# Patient Record
Sex: FEMALE | Race: BLACK OR AFRICAN AMERICAN | RURAL
[De-identification: names, ages, dates, MRNs, and addresses within clinical notes are randomized per-mention and may not be internally consistent; named-entity substitution may affect disease eponyms.]

---

## 2017-01-01 ENCOUNTER — OFFICE VISIT (OUTPATIENT)
Dept: FAMILY MEDICINE CLINIC | Age: 82
End: 2017-01-01

## 2017-01-01 ENCOUNTER — OFFICE VISIT (OUTPATIENT)
Dept: CARDIOLOGY CLINIC | Age: 82
End: 2017-01-01

## 2017-01-01 VITALS
WEIGHT: 178 LBS | HEART RATE: 71 BPM | DIASTOLIC BLOOD PRESSURE: 68 MMHG | BODY MASS INDEX: 34.95 KG/M2 | OXYGEN SATURATION: 96 % | SYSTOLIC BLOOD PRESSURE: 110 MMHG | HEIGHT: 60 IN | RESPIRATION RATE: 20 BRPM

## 2017-01-01 VITALS
WEIGHT: 175.6 LBS | SYSTOLIC BLOOD PRESSURE: 112 MMHG | RESPIRATION RATE: 16 BRPM | BODY MASS INDEX: 34.29 KG/M2 | HEART RATE: 69 BPM | DIASTOLIC BLOOD PRESSURE: 72 MMHG | OXYGEN SATURATION: 98 %

## 2017-01-01 DIAGNOSIS — I34.0 MITRAL VALVE INSUFFICIENCY, UNSPECIFIED ETIOLOGY: ICD-10-CM

## 2017-01-01 DIAGNOSIS — C90.00 MULTIPLE MYELOMA WITHOUT REMISSION (HCC): ICD-10-CM

## 2017-01-01 DIAGNOSIS — I50.22 SYSTOLIC CHF, CHRONIC (HCC): ICD-10-CM

## 2017-01-01 DIAGNOSIS — I50.9 CHRONIC CONGESTIVE HEART FAILURE, UNSPECIFIED CONGESTIVE HEART FAILURE TYPE: ICD-10-CM

## 2017-01-01 DIAGNOSIS — I48.20 CHRONIC ATRIAL FIBRILLATION (HCC): ICD-10-CM

## 2017-01-01 DIAGNOSIS — I48.20 CHRONIC ATRIAL FIBRILLATION (HCC): Primary | ICD-10-CM

## 2017-01-01 DIAGNOSIS — I10 ESSENTIAL HYPERTENSION WITH GOAL BLOOD PRESSURE LESS THAN 140/90: ICD-10-CM

## 2017-01-01 DIAGNOSIS — I42.9 CARDIOMYOPATHY (HCC): Primary | ICD-10-CM

## 2017-01-01 DIAGNOSIS — I48.3 TYPICAL ATRIAL FLUTTER (HCC): ICD-10-CM

## 2017-01-01 DIAGNOSIS — I11.9 BENIGN HYPERTENSIVE HEART DISEASE WITHOUT HEART FAILURE: ICD-10-CM

## 2017-01-01 DIAGNOSIS — I27.20 PULMONARY HYPERTENSION (HCC): ICD-10-CM

## 2017-01-01 RX ORDER — FUROSEMIDE 80 MG/1
TABLET ORAL
Qty: 135 TAB | Refills: 6 | Status: SHIPPED | OUTPATIENT
Start: 2017-01-01

## 2017-01-01 RX ORDER — IPRATROPIUM BROMIDE AND ALBUTEROL SULFATE 2.5; .5 MG/3ML; MG/3ML
SOLUTION RESPIRATORY (INHALATION)
Qty: 1620 ML | Refills: 0 | Status: SHIPPED | OUTPATIENT
Start: 2017-01-01

## 2017-01-24 NOTE — PROGRESS NOTES
1/24/2017    Chief Complaint   Patient presents with    Hypertension     Checkup        HPI: Heena Anthony is a 80 y.o. female. Very pleasant remarkable BF. The patient has had a 10 pound weight loss over the last four months. Blood studies performed in the office showed a total globulin level of 6.4. A serum immunoelectrophoresis showed an M-spike of 4.6. BUN was 21, creatinine 1.1, calcium level 8.5. Total globulin level 7.1. Sugar 115, white count 3.0, hemoglobin 10.3, hematocrit 31.2, platelet count 792,925. IgG was 6,248, IgA was 78, IgM was less than 6.     The SIEP shows a IgG monoclonal gammopathy with kappa light chain specificity. The IgG levewl is over 6 G/dl. The Pgcq-4-ypoakeuchmrpr and SFLC are elevated and the serum creatinine is 1.4. Ca is 8.4. The skeletal survey shows lytic bone changes and WBC is 2.8, HGB 10.1, Plat 117K with 47% segs.     This patient most likely has advanced multiple myeloma and needs a bone marrow biopsy to establish a definitive diagnosis with the need for emergent therapy. . I explained to the patient and her daughter that her life expectancy without treatment is probably less than three months. She is adamant that she does not want to pursue treatment. Presents today for follow up HPTN, atrial fib, CHF. She denies any pain. She is tired, not as much energy. This would be expected with her diagnosis.    Discussed that the goals of care are comfort, respect and dignity.      No Known Allergies    Current Outpatient Prescriptions   Medication Sig Dispense Refill    losartan (COZAAR) 100 mg tablet TAKE 1 TABLET BY MOUTH ONCE DAILY 90 Tab 0    carvedilol (COREG) 25 mg tablet TAKE 1 TABLET BY MOUTH TWICE DAILY WITH MEALS 180 Tab 0    furosemide (LASIX) 80 mg tablet TAKE 1 TABLET BY MOUTH EVERY MORNING AND THEN TAKE 1/2 TABLET BY MOUTH EVERY EVENING 135 Tab 6    potassium chloride (K-DUR, KLOR-CON) 20 mEq tablet TAKE 1 TABLET BY MOUTH TWICE DAILY FOR LOW POTASSIUM PREVENTION 180 Tab 1    aspirin (ASPIRIN) 325 mg tablet Take 325 mg by mouth daily.  OTHER USES RESCUE INHALER AND NEBULIZER AS NEEDED FOR BRONCHITIS/WHEEZING         Past Medical History   Diagnosis Date    Atrial fibrillation ([facility] 8/23/2012     It was decided not to anticoagulateher at her age because of ICB risks.  Atrial flutter (HCC)     Benign hypertensive heart disease without heart failure     Mitral insufficiency     Other primary cardiomyopathies ([facility]     [facility]        Lab Results   Component Value Date/Time    Glucose 119 11/15/2016 08:38 AM    LDL, calculated 51 05/06/2014 10:24 AM    Creatinine 1.14 11/15/2016 08:38 AM       ROS:  Constitutional: No fever, chills or weight loss  Respiratory: No cough, SOB   CV: No chest pain or Palpitations  GI: No nausea, vomiting or diarrhea. : No dysuria or hematuria. Neuro: No headaches, seizures, change in mental status. Physical Exam:   VS Visit Vitals    /72 (BP 1 Location: Right arm, BP Patient Position: Sitting)    Pulse 69    Resp 16    Wt 175 lb 9.6 oz (79.7 kg)    SpO2 98%    BMI 34.29 kg/m2      Eyes Conjunctiva and lids normal.    PERRLA, EOMI.   ENMT External ears and nose normal.  Canals normal, TMs normal.    Lips, teeth, gums normal, mucous membranes moist.    Oropharynx: no erythema, no exudates, no lesions, normal tongue. NECK Thyroid: normal size, nontender. Trachea midline, neck symmetrical and without masses. Carotids 2+ with no bruits. No enlarged nodes. RESP Clear to auscultation and percussion. No rales, wheezes, rhonchi, or rubs. CV RRR, with no S3 or S4, no murmur, no rub. GI   Normal bowel sounds, no bruit, soft, nontender, without masses. MSKEL Normal gait and station. Normal strength and tone, no atrophy. EXT Extremities without edema. Ethelene Gauss SKIN Skin warm, normal turgor. NEURO Cranial nerves normal 2-12.     No abnormal movement   PSYCH Judgment and insight good. Oriented to person, place, and time. Affect is alert and attentive. 1. Chronic atrial fibrillation (HCC)  Stable continue current regimen     2. Chronic congestive heart failure, unspecified congestive heart failure type ([facility]  Stable continue current regimen     3. Essential hypertension with goal blood pressure less than 140/90  Stable continue current regimen    4. Multiple myeloma without remission ([facility]  Declining any treatment. Offer support. Assure her that she will be kept comfortable. Treatment goals are comfort, respect and dignity. Follow-up Disposition:  Return if symptoms worsen or fail to improve.         TESS Bejarano

## 2017-01-24 NOTE — PATIENT INSTRUCTIONS
If you have any questions regarding [employer], you may call [employer] support at [phone].

## 2017-01-24 NOTE — MR AVS SNAPSHOT
Visit Information Date & Time Provider Department Dept. Phone Encounter #  
 1/24/2017  9:30 AM Micaela Mendieta NP [address] [place] [address] [id number] Your Appointments 2/1/2017  1:20 PM  
ESTABLISHED PATIENT with Darian Doll MD  
[address] [place] - [facility] Hieu Ruiz) Appt Note: 6 mo fu;  referral auth on file; $0cp; pt r/s; 6 mo fu; referral auth on file $0cp pt r/s  
 [address] [facility] [address] [address] [phone]  
  
   
 [address] [place] [address] Upcoming Health Maintenance Date Due DTaP/Tdap/Td series (1 - Tdap) 10/16/1941 ZOSTER VACCINE AGE 60> 10/16/1980 GLAUCOMA SCREENING Q2Y 10/16/1985 OSTEOPOROSIS SCREENING (DEXA) 10/16/1985 Pneumococcal 65+ High/Highest Risk (1 of 2 - PCV13) 10/16/1985 MEDICARE YEARLY EXAM 4/27/2017 Allergies as of 1/24/2017  Review Complete On: 1/24/2017 By: Micaela Mendieta NP No Known Allergies Current Immunizations  Reviewed on 12/27/2016 Name Date Influenza High Dose Vaccine PF 11/15/2016 Influenza Vaccine 11/27/2015 Not reviewed this visit Vitals BP Pulse Resp Weight(growth percentile) SpO2 BMI  
 112/72 (BP 1 Location: Right arm, BP Patient Position: Sitting) 69 16 175 lb 9.6 oz (79.7 kg) 98% 34.29 kg/m2 OB Status Smoking Status Menopause Never Smoker BMI and BSA Data Body Mass Index Body Surface Area  
 34.29 kg/m 2 1.84 m 2 Preferred Pharmacy Pharmacy Name Phone [address], [address] [facility] AT [facility] OF  [address] & [address] [facility] [phone] Your Updated Medication List  
  
   
This list is accurate as of: 1/24/17 10:05 AM.  Always use your most recent med list.  
  
  
  
  
 aspirin 325 mg tablet Commonly known as:  ASPIRIN Take 325 mg by mouth daily. carvedilol 25 mg tablet Commonly known as:  COREG  
TAKE 1 TABLET BY MOUTH TWICE DAILY WITH MEALS  
  
 furosemide 80 mg tablet Commonly known as:  LASIX TAKE 1 TABLET BY MOUTH EVERY MORNING AND THEN TAKE 1/2 TABLET BY MOUTH EVERY EVENING  
  
 losartan 100 mg tablet Commonly known as:  COZAAR  
TAKE 1 TABLET BY MOUTH ONCE DAILY  
  
 OTHER  
USES RESCUE INHALER AND NEBULIZER AS NEEDED FOR BRONCHITIS/WHEEZING  
  
 potassium chloride 20 mEq tablet Commonly known as:  K-DUR, KLOR-CON  
TAKE 1 TABLET BY MOUTH TWICE DAILY FOR LOW POTASSIUM PREVENTION Patient Instructions If you have any questions regarding [email or web address], you may call [email or web address] support at [phone]. Introducing [facility] & HEALTH SERVICES! Mayi Garduno introduces [employer] patient portal. Now you can access parts of your medical record, email your doctor's office, and request medication refills online. 1. In your internet browser, go to [email or web address] 2. Click on the First Time User? Click Here link in the Sign In box. You will see the New Member Sign Up page. 3. Enter your [employer] Access Code exactly as it appears below. You will not need to use this code after youve completed the sign-up process. If you do not sign up before the expiration date, you must request a new code. · [employer] Access Code: [email or web address] Expires: 2/13/2017  7:47 AM 
 
4. Enter the last four digits of your Social Security Number (xxxx) and Date of Birth (mm/dd/yyyy) as indicated and click Submit. You will be taken to the next sign-up page. 5. Create a [employer] ID. This will be your [employer] login ID and cannot be changed, so think of one that is secure and easy to remember. 6. Create a [employer] password. You can change your password at any time. 7. Enter your Password Reset Question and Answer. This can be used at a later time if you forget your password. 8. Enter your e-mail address.  You will receive e-mail notification when new information is available in [employer]. 9. Click Sign Up. You can now view and download portions of your medical record. 10. Click the Download Summary menu link to download a portable copy of your medical information. If you have questions, please visit the Frequently Asked Questions section of the [employer] website. Remember, [employer] is NOT to be used for urgent needs. For medical emergencies, dial 911. Now available from your iPhone and Android! Please provide this summary of care documentation to your next provider. Your primary care clinician is listed as Ignacio Ibrahim. If you have any questions after today's visit, please call [phone].

## 2017-02-01 NOTE — MR AVS SNAPSHOT
Visit Information Date & Time Provider Department Dept. Phone Encounter #  
 2/1/2017  1:20 PM Leticia Corona, [address] [facility] [phone] [id number] Follow-up Instructions Return in about 6 months (around 8/1/2017). Follow-up and Disposition History Your Appointments 3/20/2017 10:00 AM  
ESTABLISHED PATIENT with LUCY Sales 38 ([address]) Appt Note: 2 mo f/u  
 1000 [facility] [address] [facility],[address] [address] [phone]  
  
   
 [address] [address]  
  
    
 8/8/2017  1:40 PM  
ESTABLISHED PATIENT with Leticia Corona MD  
[address] [place] [facility] [address]) Appt Note: 6 mo fu $cp  
 [address] [facility] [address] [address] [phone]  
  
   
 [address] [place] [address] Upcoming Health Maintenance Date Due DTaP/Tdap/Td series (1 - Tdap) 10/16/1941 ZOSTER VACCINE AGE 60> 10/16/1980 GLAUCOMA SCREENING Q2Y 10/16/1985 OSTEOPOROSIS SCREENING (DEXA) 10/16/1985 Pneumococcal 65+ High/Highest Risk (1 of 2 - PCV13) 10/16/1985 MEDICARE YEARLY EXAM 4/27/2017 Allergies as of 2/1/2017  Review Complete On: 2/1/2017 By: Leticia Corona MD  
 No Known Allergies Current Immunizations  Reviewed on 12/27/2016 Name Date Influenza High Dose Vaccine PF 11/15/2016 Influenza Vaccine 11/27/2015 Not reviewed this visit You Were Diagnosed With   
  
 Codes Comments Cardiomyopathy ([facility]    -  Primary ICD-10-CM: I42.9 ICD-9-CM: 425. 4 Chronic atrial fibrillation (HCC)     ICD-10-CM: Y82.4 ICD-9-CM: 427.31 Typical atrial flutter (HCC)     ICD-10-CM: I48.3 ICD-9-CM: 427.32 Mitral valve insufficiency, unspecified etiology     ICD-10-CM: I34.0 ICD-9-CM: 424.0 Benign hypertensive heart disease without heart failure     ICD-10-CM: I11.9 ICD-9-CM: 402.10 Pulmonary hypertension ([facility]     ICD-10-CM: I27.2 ICD-9-CM: 416.8 Systolic CHF, chronic (HCC)     ICD-10-CM: I50.22 ICD-9-CM: 428.22, 428.0 Vitals BP Pulse Resp Height(growth percentile) Weight(growth percentile) SpO2  
 110/68 (BP 1 Location: Right arm, BP Patient Position: Sitting) 71 20 5' (1.524 m) 178 lb (80.7 kg) 96% BMI OB Status Smoking Status 34.76 kg/m2 Menopause Never Smoker Vitals History BMI and BSA Data Body Mass Index Body Surface Area 34.76 kg/m 2 1.85 m 2 Preferred Pharmacy Pharmacy Name Phone [address], [address] AT [facility] OF  [address] & [address] [facility] Karthik Pickard [phone] Your Updated Medication List  
  
   
This list is accurate as of: 2/1/17  2:14 PM.  Always use your most recent med list.  
  
  
  
  
 albuterol-ipratropium 2.5 mg-0.5 mg/3 ml Nebu Commonly known as:  Rhiannon Mila INHALE BY MOUTH 1 VIAL VIA NEBULIZER EVERY 4 TO 6 HOURS AS NEEDED FOR WHEEZING  
  
 aspirin 325 mg tablet Commonly known as:  ASPIRIN Take 325 mg by mouth daily. carvedilol 25 mg tablet Commonly known as:  COREG  
TAKE 1 TABLET BY MOUTH TWICE DAILY WITH MEALS  
  
 furosemide 80 mg tablet Commonly known as:  LASIX TAKE 1 TABLET BY MOUTH EVERY MORNING AND THEN TAKE 1/2 TABLET BY MOUTH EVERY EVENING  
  
 losartan 100 mg tablet Commonly known as:  COZAAR  
TAKE 1 TABLET BY MOUTH ONCE DAILY  
  
 OTHER  
USES RESCUE INHALER AND NEBULIZER AS NEEDED FOR BRONCHITIS/WHEEZING  
  
 potassium chloride 20 mEq tablet Commonly known as:  K-DUR, KLOR-CON  
TAKE 1 TABLET BY MOUTH TWICE DAILY FOR LOW POTASSIUM PREVENTION We Performed the Following AMB POC EKG ROUTINE W/ 12 LEADS, INTER & REP [47628 CPT(R)] Follow-up Instructions Return in about 6 months (around 8/1/2017). Introducing [facility] & HEALTH SERVICES!    
 [employer] introduces [employer] patient portal. Now you can access parts of your medical record, email your doctor's office, and request medication refills online. 1. In your internet browser, go to [email or web address] 2. Click on the First Time User? Click Here link in the Sign In box. You will see the New Member Sign Up page. 3. Enter your [employer] Access Code exactly as it appears below. You will not need to use this code after youve completed the sign-up process. If you do not sign up before the expiration date, you must request a new code. · [email or web address] Access Code: [email or web address] Expires: 2/13/2017  7:47 AM 
 
4. Enter the last four digits of your Social Security Number (xxxx) and Date of Birth (mm/dd/yyyy) as indicated and click Submit. You will be taken to the next sign-up page. 5. Create a [employer] ID. This will be your [employer] login ID and cannot be changed, so think of one that is secure and easy to remember. 6. Create a [employer] password. You can change your password at any time. 7. Enter your Password Reset Question and Answer. This can be used at a later time if you forget your password. 8. Enter your e-mail address. You will receive e-mail notification when new information is available in [address] 9. Click Sign Up. You can now view and download portions of your medical record. 10. Click the Download Summary menu link to download a portable copy of your medical information. If you have questions, please visit the Frequently Asked Questions section of the [email or web address] website. Remember, [email or web address] is NOT to be used for urgent needs. For medical emergencies, dial 911. Now available from your iPhone and Android! Please provide this summary of care documentation to your next provider. Your primary care clinician is listed as Sofia Eli. If you have any questions after today's visit, please call [phone].

## 2017-02-01 NOTE — PROGRESS NOTES
Eda Ramos is a 80 y.o. female is here for routine f/u. Occasional dyspnea/wheezing, relieved with \"rescue\" inhaler. No other CV sx or complaints. Mild stable OLIVO. The patient denies chest pain/ shortness of breath, orthopnea, PND, LE edema, palpitations, syncope, presyncope or fatigue. Patient Active Problem List    Diagnosis Date Noted    Multiple myeloma without remission ([address] 12/27/2016    MGUS (monoclonal gammopathy of unknown significance) 12/12/2016    Pancytopenia ([address] 05/06/2014    Atrial fibrillation ([address] 08/23/2012    Other primary cardiomyopathies ([address]     Pulmonary hypertension ([address]     Mitral insufficiency     Atrial flutter (HCC)     Benign hypertensive heart disease without heart failure       Micaela Mendieta NP  Past Medical History   Diagnosis Date    Atrial fibrillation ([address] 8/23/2012     It was decided not to anticoagulateher at her age because of ICB risks.  Atrial flutter (HCC)     Benign hypertensive heart disease without heart failure     Mitral insufficiency     Other primary cardiomyopathies ([address]     Pulmonary hypertension ([address]       No past surgical history on file. No Known Allergies   Family History   Problem Relation Age of Onset    Hypertension Mother       Social History     Social History    Marital status: SINGLE     Spouse name: N/A    Number of children: N/A    Years of education: N/A     Occupational History    Not on file.      Social History Main Topics    Smoking status: Never Smoker    Smokeless tobacco: Not on file    Alcohol use No    Drug use: Not on file    Sexual activity: Not on file     Other Topics Concern     Service No    Blood Transfusions No    Caffeine Concern No    Occupational Exposure No    Hobby Hazards No    Sleep Concern No    Stress Concern No    Weight Concern No    Special Diet No    Back Care No    Exercise Yes     Waiking    Bike Helmet No    Seat Belt Yes    Self-Exams No Social History Narrative      Current Outpatient Prescriptions   Medication Sig    albuterol-ipratropium (DUO-NEB) 2.5 mg-0.5 mg/3 ml nebu INHALE BY MOUTH 1 VIAL VIA NEBULIZER EVERY 4 TO 6 HOURS AS NEEDED FOR WHEEZING    losartan (COZAAR) 100 mg tablet TAKE 1 TABLET BY MOUTH ONCE DAILY    carvedilol (COREG) 25 mg tablet TAKE 1 TABLET BY MOUTH TWICE DAILY WITH MEALS    furosemide (LASIX) 80 mg tablet TAKE 1 TABLET BY MOUTH EVERY MORNING AND THEN TAKE 1/2 TABLET BY MOUTH EVERY EVENING    potassium chloride (K-DUR, KLOR-CON) 20 mEq tablet TAKE 1 TABLET BY MOUTH TWICE DAILY FOR LOW POTASSIUM PREVENTION    OTHER USES RESCUE INHALER AND NEBULIZER AS NEEDED FOR BRONCHITIS/WHEEZING    aspirin (ASPIRIN) 325 mg tablet Take 325 mg by mouth daily. No current facility-administered medications for this visit. Review of Symptoms:    CONST  No weight change. No fever, chills, sweats    ENT No visual changes, URI sx, sore throat    CV  See HPI   RESP  No cough, or sputum, wheezing. Also see HPI   GI  No abdominal pain or change in bowel habits. No heartburn or dysphagia. No melena or rectal bleeding.   No dysuria, urgency, frequency, hematuria   MSKEL  No joint pain, swelling. No muscle pain. SKIN  No rash or lesions. NEURO  No headache, syncope, or seizure. No weakness, loss of sensation, or paresthesias. PSYCH  No low mood or depression  No anxiety. HE/LYMPH  No easy bruising, abnormal bleeding, or enlarged glands.         Physical ExamPhysical Exam:    Visit Vitals    /68 (BP 1 Location: Right arm, BP Patient Position: Sitting)    Pulse 71    Resp 20    Ht 5' (1.524 m)    Wt 178 lb (80.7 kg)    SpO2 96%    BMI 34.76 kg/m2     Gen: NAD  HEENT:  PERRL, throat clear  Neck: no mass or thyromegaly, no JVD   Heart:  irregular,Nl S1S2,  II/VI murmur, no gallop or rub.   Lungs:  clear  Abdomen:   Soft, non-tender, bowel sounds are active.   Extremities:  No edema  Pulse: symmetric  Neuro: A&O times 3, WNL      Cardiographics    ECG: afib, HR 74, PRWP/old AMI, NSST    CARDIAC TESTING    Echo 8/2012: LVE, EF 20-25%, LAE, RVE, LUCERO, mild MR, PA 62    Echo 8/8/16:  LVEF 35-40, sev RVE, mod RV dysfunction, sev LAE, modo LUCERO, mod MAC, mod to severe MR, mod to sev TR, RVSP 38.       Labs:   Lab Results   Component Value Date/Time    Sodium 139 11/15/2016 08:38 AM    Sodium 140 07/26/2016 10:05 AM    Sodium 138 04/26/2016 10:49 AM    Sodium 141 11/17/2015 11:28 AM    Sodium 139 06/23/2015 12:27 PM    Potassium 4.5 11/15/2016 08:38 AM    Potassium 4.0 07/26/2016 10:05 AM    Potassium 4.7 04/26/2016 10:49 AM    Potassium 4.7 11/17/2015 11:28 AM    Potassium 4.6 06/23/2015 12:27 PM    Chloride 104 11/15/2016 08:38 AM    Chloride 102 07/26/2016 10:05 AM    Chloride 103 04/26/2016 10:49 AM    Chloride 105 11/17/2015 11:28 AM    Chloride 104 06/23/2015 12:27 PM    CO2 25 11/15/2016 08:38 AM    CO2 26 07/26/2016 10:05 AM    CO2 23 04/26/2016 10:49 AM    CO2 26 11/17/2015 11:28 AM    CO2 23 06/23/2015 12:27 PM    Glucose 119 11/15/2016 08:38 AM    Glucose 115 07/26/2016 10:05 AM    Glucose 109 04/26/2016 10:49 AM    Glucose 123 11/17/2015 11:28 AM    Glucose 100 06/23/2015 12:27 PM    BUN 21 11/15/2016 08:38 AM    BUN 18 07/26/2016 10:05 AM    BUN 19 04/26/2016 10:49 AM    BUN 25 11/17/2015 11:28 AM    BUN 21 06/23/2015 12:27 PM    Creatinine 1.14 11/15/2016 08:38 AM    Creatinine 1.07 07/26/2016 10:05 AM    Creatinine 1.07 04/26/2016 10:49 AM    Creatinine 1.18 11/17/2015 11:28 AM    Creatinine 0.97 06/23/2015 12:27 PM    BUN/Creatinine ratio 18 11/15/2016 08:38 AM    BUN/Creatinine ratio 17 07/26/2016 10:05 AM    BUN/Creatinine ratio 18 04/26/2016 10:49 AM    BUN/Creatinine ratio 21 11/17/2015 11:28 AM    BUN/Creatinine ratio 22 06/23/2015 12:27 PM    GFR est AA 47 11/15/2016 08:38 AM    GFR est AA 51 07/26/2016 10:05 AM    GFR est AA 51 04/26/2016 10:49 AM    GFR est AA 45 11/17/2015 11:28 AM GFR est AA 58 06/23/2015 12:27 PM    GFR est non-AA 41 11/15/2016 08:38 AM    GFR est non-AA 44 07/26/2016 10:05 AM    GFR est non-AA 44 04/26/2016 10:49 AM    GFR est non-AA 39 11/17/2015 11:28 AM    GFR est non-AA 50 06/23/2015 12:27 PM    Calcium 8.5 11/15/2016 08:38 AM    Calcium 8.7 07/26/2016 10:05 AM    Calcium 9.1 04/26/2016 10:49 AM    Calcium 8.9 11/17/2015 11:28 AM    Calcium 9.3 06/23/2015 12:27 PM    Bilirubin, total 0.8 11/15/2016 08:38 AM    Bilirubin, total 1.3 07/26/2016 10:05 AM    Bilirubin, total 1.0 04/26/2016 10:49 AM    Bilirubin, total 0.7 11/17/2015 11:28 AM    Bilirubin, total 0.4 06/23/2015 12:27 PM    AST (SGOT) 15 11/15/2016 08:38 AM    AST (SGOT) 17 07/26/2016 10:05 AM    AST (SGOT) 16 04/26/2016 10:49 AM    AST (SGOT) 17 11/17/2015 11:28 AM    AST (SGOT) 14 06/23/2015 12:27 PM    Alk. phosphatase 77 11/15/2016 08:38 AM    Alk. phosphatase 73 07/26/2016 10:05 AM    Alk. phosphatase 66 04/26/2016 10:49 AM    Alk. phosphatase 62 11/17/2015 11:28 AM    Alk.  phosphatase 68 06/23/2015 12:27 PM    Protein, total 9.5 11/15/2016 08:38 AM    Protein, total 9.0 07/26/2016 10:05 AM    Protein, total 9.1 04/26/2016 10:49 AM    Protein, total 8.2 11/17/2015 11:28 AM    Protein, total 8.5 06/23/2015 12:27 PM    Albumin 2.4 11/15/2016 08:38 AM    Albumin 2.7 07/26/2016 10:05 AM    Albumin 3.5 04/26/2016 10:49 AM    Albumin 3.2 11/17/2015 11:28 AM    Albumin 3.6 06/23/2015 12:27 PM    A-G Ratio 0.3 11/15/2016 08:38 AM    A-G Ratio 0.4 07/26/2016 10:05 AM    A-G Ratio 0.6 04/26/2016 10:49 AM    A-G Ratio 0.6 11/17/2015 11:28 AM    A-G Ratio 0.7 06/23/2015 12:27 PM    ALT (SGPT) 6 11/15/2016 08:38 AM    ALT (SGPT) 10 07/26/2016 10:05 AM    ALT (SGPT) 12 04/26/2016 10:49 AM    ALT (SGPT) 14 11/17/2015 11:28 AM    ALT (SGPT) 9 06/23/2015 12:27 PM     No results found for: CPK, CPKX, CPX  Lab Results   Component Value Date/Time    Cholesterol, total 127 05/06/2014 10:24 AM    HDL Cholesterol 55 05/06/2014 10:24 AM    LDL, calculated 51 05/06/2014 10:24 AM    Triglyceride 105 05/06/2014 10:24 AM     No results found for this or any previous visit. Assessment:         Patient Active Problem List    Diagnosis Date Noted    [facility] 12/27/2016    MGUS (monoclonal gammopathy of unknown significance) 12/12/2016    Pancytopenia ([facility] 05/06/2014    Atrial fibrillation ([facility] 08/23/2012    [facility] primary cardiomyopathies ([facility]     Pulmonary hypertension ([facility]     Mitral insufficiency     Atrial flutter (HCC)     Benign hypertensive heart disease without heart failure       Occasional dyspnea/wheezing, relieved with \"rescue\" inhaler. No other CV sx or complaints. Mild stable OLIVO. Plan:     Doing well with no adverse cardiac symptoms. Lipids and labs followed by PCP. Continue current care and f/u in 6 months.     Tavares Jensen MD